# Patient Record
Sex: FEMALE | Race: WHITE | Employment: OTHER | URBAN - METROPOLITAN AREA
[De-identification: names, ages, dates, MRNs, and addresses within clinical notes are randomized per-mention and may not be internally consistent; named-entity substitution may affect disease eponyms.]

---

## 2017-03-13 RX ORDER — ESTRADIOL 10 UG/1
TABLET VAGINAL
Qty: 36 TABLET | Refills: 2 | Status: SHIPPED | OUTPATIENT
Start: 2017-03-13 | End: 2017-06-11

## 2017-03-13 NOTE — TELEPHONE ENCOUNTER
Talked with patient, will refill this time and then the patient follow up with Dr. Candelario Flowers in the future, encouraged patient to call with any c/o, denies any at present,

## 2017-11-15 PROBLEM — K52.9 IBD (INFLAMMATORY BOWEL DISEASE): Status: ACTIVE | Noted: 2017-11-15

## 2017-12-21 PROCEDURE — 82043 UR ALBUMIN QUANTITATIVE: CPT | Performed by: INTERNAL MEDICINE

## 2017-12-21 PROCEDURE — 82570 ASSAY OF URINE CREATININE: CPT | Performed by: INTERNAL MEDICINE

## 2018-06-04 PROBLEM — E66.01 OBESITY, MORBID, BMI 40.0-49.9 (HCC): Status: ACTIVE | Noted: 2018-06-04

## 2019-01-03 PROCEDURE — 87045 FECES CULTURE AEROBIC BACT: CPT | Performed by: INTERNAL MEDICINE

## 2019-01-03 PROCEDURE — 87209 SMEAR COMPLEX STAIN: CPT | Performed by: INTERNAL MEDICINE

## 2019-01-03 PROCEDURE — 87272 CRYPTOSPORIDIUM AG IF: CPT | Performed by: INTERNAL MEDICINE

## 2019-01-03 PROCEDURE — 88305 TISSUE EXAM BY PATHOLOGIST: CPT | Performed by: INTERNAL MEDICINE

## 2019-01-03 PROCEDURE — 87329 GIARDIA AG IA: CPT | Performed by: INTERNAL MEDICINE

## 2019-01-03 PROCEDURE — 87427 SHIGA-LIKE TOXIN AG IA: CPT | Performed by: INTERNAL MEDICINE

## 2019-01-03 PROCEDURE — 88365 INSITU HYBRIDIZATION (FISH): CPT | Performed by: INTERNAL MEDICINE

## 2019-01-03 PROCEDURE — 87493 C DIFF AMPLIFIED PROBE: CPT | Performed by: INTERNAL MEDICINE

## 2019-01-03 PROCEDURE — 87177 OVA AND PARASITES SMEARS: CPT | Performed by: INTERNAL MEDICINE

## 2019-01-03 PROCEDURE — 87046 STOOL CULTR AEROBIC BACT EA: CPT | Performed by: INTERNAL MEDICINE

## 2019-08-16 PROBLEM — M25.511 RIGHT SHOULDER PAIN, UNSPECIFIED CHRONICITY: Status: ACTIVE | Noted: 2019-08-16

## 2019-08-19 PROBLEM — M75.101 ROTATOR CUFF SYNDROME OF RIGHT SHOULDER: Status: ACTIVE | Noted: 2019-08-19

## 2021-03-24 PROCEDURE — 88305 TISSUE EXAM BY PATHOLOGIST: CPT | Performed by: INTERNAL MEDICINE

## 2021-05-07 PROBLEM — K50.10 CROHN'S DISEASE OF COLON WITHOUT COMPLICATION (HCC): Status: ACTIVE | Noted: 2021-05-07

## 2024-11-04 PROBLEM — D72.829 LEUKOCYTOSIS: Status: ACTIVE | Noted: 2024-07-27

## 2024-11-04 PROBLEM — I95.9 HYPOTENSIVE EPISODE: Status: ACTIVE | Noted: 2024-07-26

## 2024-11-04 PROBLEM — M25.561 PAIN IN RIGHT KNEE: Status: ACTIVE | Noted: 2024-07-26

## 2024-11-04 PROBLEM — M17.11 PRIMARY OSTEOARTHRITIS OF RIGHT KNEE: Status: ACTIVE | Noted: 2024-07-22

## 2024-11-04 PROBLEM — R55 SYNCOPE: Status: ACTIVE | Noted: 2024-07-26

## 2024-11-04 PROBLEM — K52.9 GASTROENTERITIS: Status: ACTIVE | Noted: 2024-07-26

## 2024-11-04 PROBLEM — H25.13 AGE-RELATED NUCLEAR CATARACT OF BOTH EYES: Status: ACTIVE | Noted: 2023-01-03

## 2024-11-04 RX ORDER — BALSALAZIDE DISODIUM 750 MG/1
3 CAPSULE ORAL 2 TIMES DAILY
COMMUNITY

## 2024-11-04 RX ORDER — AZELASTINE 1 MG/ML
2 SPRAY, METERED NASAL 2 TIMES DAILY
COMMUNITY

## 2024-11-04 RX ORDER — LORATADINE 10 MG/1
10 TABLET ORAL DAILY
COMMUNITY

## 2024-11-13 ENCOUNTER — LABORATORY ENCOUNTER (OUTPATIENT)
Dept: LAB | Age: 74
End: 2024-11-13
Attending: ORTHOPAEDIC SURGERY
Payer: MEDICARE

## 2024-11-13 ENCOUNTER — EKG ENCOUNTER (OUTPATIENT)
Dept: LAB | Age: 74
End: 2024-11-13
Attending: ORTHOPAEDIC SURGERY
Payer: MEDICARE

## 2024-11-13 DIAGNOSIS — Z01.818 PRE-OP TESTING: ICD-10-CM

## 2024-11-13 LAB
ALBUMIN SERPL-MCNC: 4.2 G/DL (ref 3.2–4.8)
ALBUMIN/GLOB SERPL: 1.7 {RATIO} (ref 1–2)
ALP LIVER SERPL-CCNC: 73 U/L
ALT SERPL-CCNC: 13 U/L
ANION GAP SERPL CALC-SCNC: 5 MMOL/L (ref 0–18)
ANTIBODY SCREEN: NEGATIVE
AST SERPL-CCNC: 31 U/L (ref ?–34)
ATRIAL RATE: 78 BPM
BASOPHILS # BLD AUTO: 0.07 X10(3) UL (ref 0–0.2)
BASOPHILS NFR BLD AUTO: 0.9 %
BILIRUB SERPL-MCNC: 0.5 MG/DL (ref 0.2–1.1)
BUN BLD-MCNC: 13 MG/DL (ref 9–23)
CALCIUM BLD-MCNC: 10.6 MG/DL (ref 8.7–10.4)
CHLORIDE SERPL-SCNC: 109 MMOL/L (ref 98–112)
CO2 SERPL-SCNC: 26 MMOL/L (ref 21–32)
CREAT BLD-MCNC: 0.87 MG/DL
EGFRCR SERPLBLD CKD-EPI 2021: 71 ML/MIN/1.73M2 (ref 60–?)
EOSINOPHIL # BLD AUTO: 0.46 X10(3) UL (ref 0–0.7)
EOSINOPHIL NFR BLD AUTO: 5.9 %
ERYTHROCYTE [DISTWIDTH] IN BLOOD BY AUTOMATED COUNT: 15.6 %
GLOBULIN PLAS-MCNC: 2.5 G/DL (ref 2–3.5)
GLUCOSE BLD-MCNC: 90 MG/DL (ref 70–99)
HCT VFR BLD AUTO: 40.9 %
HGB BLD-MCNC: 13.1 G/DL
IMM GRANULOCYTES # BLD AUTO: 0.02 X10(3) UL (ref 0–1)
IMM GRANULOCYTES NFR BLD: 0.3 %
LYMPHOCYTES # BLD AUTO: 1.89 X10(3) UL (ref 1–4)
LYMPHOCYTES NFR BLD AUTO: 24.1 %
MCH RBC QN AUTO: 28.7 PG (ref 26–34)
MCHC RBC AUTO-ENTMCNC: 32 G/DL (ref 31–37)
MCV RBC AUTO: 89.7 FL
MONOCYTES # BLD AUTO: 0.52 X10(3) UL (ref 0.1–1)
MONOCYTES NFR BLD AUTO: 6.6 %
NEUTROPHILS # BLD AUTO: 4.89 X10 (3) UL (ref 1.5–7.7)
NEUTROPHILS # BLD AUTO: 4.89 X10(3) UL (ref 1.5–7.7)
NEUTROPHILS NFR BLD AUTO: 62.2 %
OSMOLALITY SERPL CALC.SUM OF ELEC: 290 MOSM/KG (ref 275–295)
P AXIS: 51 DEGREES
P-R INTERVAL: 158 MS
PLATELET # BLD AUTO: 237 10(3)UL (ref 150–450)
POTASSIUM SERPL-SCNC: 4.1 MMOL/L (ref 3.5–5.1)
PROT SERPL-MCNC: 6.7 G/DL (ref 5.7–8.2)
Q-T INTERVAL: 368 MS
QRS DURATION: 72 MS
QTC CALCULATION (BEZET): 419 MS
R AXIS: 21 DEGREES
RBC # BLD AUTO: 4.56 X10(6)UL
RH BLOOD TYPE: POSITIVE
SODIUM SERPL-SCNC: 140 MMOL/L (ref 136–145)
T AXIS: 76 DEGREES
VENTRICULAR RATE: 78 BPM
WBC # BLD AUTO: 7.9 X10(3) UL (ref 4–11)

## 2024-11-13 PROCEDURE — 93005 ELECTROCARDIOGRAM TRACING: CPT

## 2024-11-13 PROCEDURE — 86901 BLOOD TYPING SEROLOGIC RH(D): CPT

## 2024-11-13 PROCEDURE — 86850 RBC ANTIBODY SCREEN: CPT

## 2024-11-13 PROCEDURE — 80053 COMPREHEN METABOLIC PANEL: CPT

## 2024-11-13 PROCEDURE — 36415 COLL VENOUS BLD VENIPUNCTURE: CPT

## 2024-11-13 PROCEDURE — 87081 CULTURE SCREEN ONLY: CPT

## 2024-11-13 PROCEDURE — 85025 COMPLETE CBC W/AUTO DIFF WBC: CPT

## 2024-11-13 PROCEDURE — 86900 BLOOD TYPING SEROLOGIC ABO: CPT

## 2024-11-13 PROCEDURE — 93010 ELECTROCARDIOGRAM REPORT: CPT | Performed by: INTERNAL MEDICINE

## 2024-11-14 NOTE — H&P (VIEW-ONLY)
Patient presents with:  Pre-Op: Patient is scheduled for right knee replacement on 11/25/24 with Dr. Daniel Pang @ [facility] fax # [phone]    Chirag Shelton is a 74 year old female who presents for a pre-operative physical exam. Patient is to have RIGHT KNEE REPLACEMENT SURGERY, to be done by Dr. Pang at  on 11/25/2024.      HPI:   Pt has no complaints.  Her sleep is not good but doesn't think its related apnea .. Her  states that she moans when asleep    Current Outpatient Medications   Medication Sig Dispense Refill   • AZELASTINE 0.1 % Nasal Solution USE 1 SPRAY IN EACH NOSTRIL TWICE DAILY 30 mL 0   • ATORVASTATIN 10 MG Oral Tab TAKE 1 TABLET(10 MG) BY MOUTH 3 TIMES A WEEK 38 tablet 0   • Naproxen Sodium (ALEVE OR) Take by mouth. Bid     • tiZANidine HCl 2 MG Oral Cap Take 1 capsule (2 mg total) by mouth 3 (three) times daily as needed for Muscle spasms. 30 capsule 0   • gabapentin 100 MG Oral Cap Take 1 capsule (100 mg total) by mouth nightly. 30 capsule 2   • balsalazide (COLAZAL) 750 MG Oral Cap Take 3 capsules (2,250 mg total) by mouth 2 (two) times a day. 540 capsule 4   • Estradiol 10 MCG Vaginal Tab INSERT 1 TABLET VAGINALLY 2 TIMES A WEEK Strength: 10 mcg 24 tablet 4   • loratadine 10 MG Oral Tab Take 1 tablet (10 mg total) by mouth daily. 30 tablet 3   • lisinopril 5 MG Oral Tab TAKE 1 TABLET(5 MG) BY MOUTH DAILY 90 tablet 1   • Calcium-Vitamin D-Vitamin K (CALCIUM FOR WOMEN OR) Take by mouth.     • Inulin 1.5 G Oral Chew Tab Chew 1 tablet by mouth daily. 30 tablet 0   • Psyllium 0.52 G Oral Cap Take 2 capsules by mouth daily.       • Multiple Vitamins Oral Tab Take 1 tablet by mouth daily.     • Fish Oil-Cholecalciferol (FISH OIL + D3) 0689-5004 MG-UNIT Oral Cap Take 1 capsule by mouth daily.       • aspirin 81 MG Oral Tab EC Take 81 mg by mouth daily.        Allergies:   Zithromax [Azithrom*    NAUSEA AND VOMITING   Past Medical History:   Diagnosis Date   • COVID 09/09/2022     Patient reported from home test   • Crohn's    • History of prolapse of bladder    • Hypertension    • IBD (inflammatory bowel disease) 11/15/2017   • MENOPAUSE     6 years ago   • Mixed hyperlipidemia 05/09/2007   • Primary osteoarthritis involving multiple joints 11/21/2012      Past Surgical History:   Procedure Laterality Date   • CHOLECYSTECTOMY  9/20/11    Laparoscopic Cholecystectomy by Dr. Thompson @ [facility]   • COLONOSCOPY  2004, 2012    Repeat 3 years   • COLONOSCOPY  01/03/2019    Dr. Lagunas   • COLONOSCOPY,BIOPSY N/A 8/13/2015    Procedure: COLONOSCOPY, POSSIBLE BIOPSY, POSSIBLE POLYPECTOMY 80856;  Surgeon: Santhosh Lagunas MD;  Location: [facility] [place], [place] SURGICAL [facility]   • D & C  1980    miscarriage   • HYSTEROSCOPY,WITH SAMPLING  01/05/2012   • KNEE REPLACEMENT SURGERY     • OTHER SURGICAL HISTORY  12/13/2011    Cystoscopy - Dr. Awan   • OTHER SURGICAL HISTORY      left knee   • OTHER SURGICAL HISTORY Bilateral 7/11/2016    Procedure: VAGINAL HYSTERECTOMY WITH BILATERAL SALPINGOOPH;  Surgeon: Dejah Sam MD;  Location: [facility]   • OTHER SURGICAL HISTORY N/A 7/11/2016    Procedure: ANTERIOR POSTERIOR REPAIR;  Surgeon: Leana Davis DO;  Location:  MAIN OR   • TONSILLECTOMY      long time ago      Family History   Adopted: Yes   Family history unknown: Yes      Social History: Occ: Not Working. : Yes. Children: Yes. Smoking: No. EtOH: Socially.  Exercise: minimal.  Diet: watches fats closely and watches sugar closely     REVIEW OF SYSTEMS:   GENERAL: feels well otherwise  SKIN: denies any unusual skin lesions  EYES:denies blurred vision or double vision  HEENT: denies nasal congestion, sinus pain or ST  LUNGS: denies shortness of breath with exertion  CARDIOVASCULAR: denies chest pain on exertion  GI: denies abdominal pain,denies heartburn  : denies dysuria, vaginal discharge or itching, denies nocturia or changes in stream  MUSCULOSKELETAL: chronic progressive right knee pain .. Difficulty  walking; denies back pain  NEURO: denies headaches  PSYCHE: denies depression or anxiety  HEMATOLOGIC: denies hx of anemia  ENDOCRINE: denies thyroid history  ALL/ASTHMA: denies hx of allergy or asthma    EXAM:   /78   Pulse 82   Temp 97.6 °F (36.4 °C) (Oral)   Ht 5' 3.78\" (1.62 m)   Wt 238 lb (108 kg)   SpO2 98%   BMI 41.13 kg/m²   GENERAL: well developed, well nourished,in no apparent distress  SKIN: no rashes,no suspicious lesions  HEENT: atraumatic, normocephalic,ears and throat are clear  EYES:PERRLA, EOMI, normal optic disk,conjunctiva are clear  NECK: supple,no adenopathy,no bruits  CHEST: no chest tenderness  BREAST: no dominant or suspicious mass  LUNGS: clear to auscultation  CARDIO: RRR without murmur  GI: good BS's,no masses, HSM or tenderness  : deferred  RECTAL: deferred  MUSCULOSKELETAL: back is not tender,FROM of the back  EXTREMITIES: no cyanosis, clubbing or edema  NEURO: Oriented times three,cranial nerves are intact,motor and sensory are grossly intact    ASSESSMENT AND PLAN:   Chirag Shelton is a 74 year old female who presents for a 1. Pre-op exam  2. Primary osteoarthritis of right knee  - reviewed pt's history and meds  - ELECTROCARDIOGRAM, COMPLETE- NSR/ Left Atrial Enlargement/ Borderline ECG compared to 2022  - Patient is to have RIGHT KNEE REPLACEMENT SURGERY, to be done by Dr. Pang at  on 11/25/2024.  .   - Pt has the following conditions:       3. Mixed hyperlipidemia  - Continue with current meds    4. Essential hypertension  - Continue with Lisinopril    5. Obesity, morbid, BMI 40.0-49.9 (HCC)  - weight management     6. Crohn's disease of colon without complication ([facility])  - Hold Colazal before surgery per gi recommendation    Hold all supplements/ MVI 2 weeks before     Pt has no significant history of cardiac or pulmonary conditions.   Pt is a moderate acceptable risk surgical candidate based on her weight .   This consult was sent back the referring physician,   Poly.   ID#[id number]

## 2024-11-15 ENCOUNTER — ANESTHESIA EVENT (OUTPATIENT)
Dept: SURGERY | Facility: HOSPITAL | Age: 74
End: 2024-11-15
Payer: MEDICARE

## 2024-11-15 NOTE — PAT NURSING NOTE
Chart reviewed by anesthesiologist DR Jauregui for abnormal EKG. Received an order for  cardiac clearance. Faxed this request to the surgeon and Received fax confirmation. Telephoned PCP office and spoke to Queenie and matheus of above and requested that the clearance be faxed to the PAT department ASAP.

## 2024-11-18 NOTE — PAT NURSING NOTE
Received EKG done 11/14 (day after [facility] EKG) per pcp. Pcp cleared pt for surgery in H and P. Spoke w /Anesthesia to review new EKG and determine if any further action needed. Spoke to pt earlier today and she does not have cardio appt yet. Will notify pt if cardio clearance no longer required.

## 2024-11-22 NOTE — DISCHARGE INSTRUCTIONS
Medications:  Aspirin 81 mg:  take one tab twice daily to prevent blood clot.    Colace is a stool softener.  Please take twice daily.  Extra Strength Tylenol.  Take 2 tabs (1000mg) 4 times daily.  (maximum 4000mg daily)  Celebrex is a nonsteroidal anti-inflammatory medication. Please take one tab daily.  Tramadol is a pain medication for moderate pain.  Use as needed per instruction.  Oxycodone is a pain medication for severe pain.  Use as needed per instruction  Do not take Tramadol and Oxydone at the same time.  Space out at least one hour.    Sometimes managing your health at home requires assistance.  The [facility] team has recognized your preference to use [facility].  They can be reached by phone at [phone].  The fax number for your reference is [phone].  A representative from the home health agency will contact you or your family to schedule your first visit.     Knee Replacement Discharge Instructions    Dear Patient,     [facility] cares about your progress with recovery following your joint replacement surgery.     300 days from your scheduled surgery, [facility] will send you a follow-up survey to help us understand how your surgery impacted your mobility, pain, and overall quality of life. Please make every effort to complete this survey. The information collected from this survey will be used by your physician to track your recovery.     Sincerely,     [facility] [place]      Activity    Bathing  No tub baths, pools, or saunas until cleared by surgeon (about 4-6 weeks because it takes that long for the incision on the skin to heal and be a barrier to prevent infection).  When allowed to shower:    IF AQUACEL - dressing is waterproof and does not require being covered to keep it dry.  Pat dressing and surrounding skin dry after shower              AQUACEL          Gauze dressings are NOT waterproof and REQUIRE being covered  with a waterproof barrier to keep the dressing and the incision dry.  SARAN WRAP, GLAD WRAP, and PRESS N SEAL WORK  REALLY WELL BUT ANY PLASTIC WRAP WILL DO.   Do not wash incision.   Remove entire wrapping and old dressing (if Gauze) after showering. Pat dry if necessary WITH A CLEAN TOWEL and cover incision with dry sterile gauze and paper tape. For other types of dressings, follow surgeon’s orders.                                 GAUZE          Driving  Do not drive until cleared by surgeon. This is usually in four to six weeks after surgery. Discuss at follow-up office visit.   Not allowed while taking narcotic pain medication or muscle relaxants.    Sex  Usually allowed after four to six weeks - check with surgeon at your office visit.    Return to work  Usually allowed after four to six weeks. Discuss specific work activities with your surgeon.    Restrictions  For knee replacement surgery, follow instructions provided by physical therapy.  Do NOT put a pillow under your knee as it may be more difficult to straighten afterwards.    No smoking  Avoid smoking. It is known to cause breathing problems and can decrease the rate of healing.    Incision care/Dressing changes  Wash hands before and after dressing changes.  FOR DRY GAUZE DRESSING:  Change dressing daily using dry sterile gauze and paper tape once Aquacel (waterproof) dressing changed (which is about 7 days after surgery). Continue this until you follow up in the office with your surgeon. Have knee bent when changing dressing for more ease of bending afterwards.  There could be a small amount of redness around the staples or incision; this is normal.  Watch for increased redness, warmth, any odor, increased drainage or opening of the incision. A little clear yellow or blood tinged drainage is normal up to 2 weeks after surgery but it should be less every day until it stops.  Call physician if you notice any concerning changes.  Sutures/staples will be  removed at first office visit (10 days- 3 weeks).                          GAUZE                                                   Medication  Anticoagulants = blood thinners (Xarelto, Eliquis, Lovenox, Coumadin, or Aspirin)  Pill or shot form depending on what your physician orders.   IF placed on Coumadin, you may also need lab work done for monitoring.  You will bleed easier and bruise easier while on these medications.   Usually you will be on a blood thinner for about 2-5 weeks depending what physician orders.  Contact your physician if you have signs of bruising, nose bleeds or blood in your urine. You may consider using electric razors and soft bristle toothbrushes.  Do not take aspirin while taking blood thinners unless ordered by your physician.  Review anticoagulant education information sheet provided.    Discomfort  Surgical discomfort is normal for one to two months.  Have realistic goals and keep a positive outlook.  You may need pain medication regularly (every 4-6 hours) the first 2 weeks and then begin to decrease how often you are taking it.  Take pain medication as prescribed with food, especially before therapy, allowing 30-60 minutes to take effect.  Do not drink alcohol while on pain medication.  Keep pain manageable; pain should not disrupt your sleep or activities like getting out of bed or walking.  As you have less discomfort, decrease the amount of pain medication you take. Use plain Tylenol (acetaminophen) for less severe pain.  Some pain medications have Tylenol (acetaminophen) in them such as Norco and Percocet. Do NOT take Tylenol (acetaminophen) within 4 hours of a dose of these medications.  Apply ice or cold therapy to surgical site for 20 minutes at least four times a day, especially after therapy. Be sure there is a thin cloth barrier between skin and ice or cold therapy.  Change position at least every 45 minutes while awake to avoid stiffness or increased discomfort.  For knee  replacements- may elevate your leg by placing a pillow under entire leg. Do not place pillow only under the knee.  Have realistic goals and keep a positive outlook.  Deep breathing and relaxation techniques and distractions can help!  If you focus on something else, you do not experience the pain the same. Take advantage of everything available to your to help control you discomfort.  Contact physician if discomfort does not respond to pain medication.    Body changes  Constipation is common with the use of narcotics.  Eat fiber rich foods and drink plenty of fluids.  Use stool softeners such as Colace or Senakot while on narcotics, and laxatives such as Miralax or Milk of Magnesia if needed.   An enema or suppository may be needed if above measures do not work.    Prevention of infection and promotion of healing  Good hand washing is important. Everyone should wash their hands or use hand  as soon as they walk in your house-whether they live there or are visiting.  Keep bed linen/clothing freshly laundered.  Do not allow others or pets to touch your incision.  Avoid people that have colds or the flu.  Your surgeon may recommend that you take antibiotics before you undergo any dental or other invasive surgical procedures after your joint replacement. Speak with your physician about this at your post-op office visit.  Eat a balanced diet high in fiber and drink plenty of fluids.   Continue using incentive spirometry because narcotics make you sleepy so you may not take good deep breaths. We do not want you to get pneumonia.     Post op Office visits  Schedule 10 days to 3 weeks after surgery WITH SURGEON’s office.  Additional visits may need to be scheduled. Your physician will discuss this at first post-op office visit.  Schedule outpatient physical therapy per your surgeon’s orders.  Schedule one week follow up after surgery WITH PRIMARY CARE PHYSICIAN; review your medications over last 6 months. Your body  gets stressed by surgery and that stress can affect all your other health issues (such as high blood pressure, diabetes, CHF, afib, and asthma just to name a few).  We don’t want those other health issues to cause you to get readmitted to the hospital; much better for you to catch developing problems and prevent them from becoming larger ones.      Notify your surgeon if you notice any  of the following signs  Separation of incision line.  Increased redness, swelling, or warmth of skin around incision.  Increased or foul smelling drainage from incision  Red streaks on skin near incision.  Temperature >101 F.  Increased pain at incision not relieved by pain medication.      Signs of blood clot  Pain, excessive tenderness, redness, or swelling in leg or calf (other than incision site).  (CALL SURGEON)      Go directly to the ER or CALL 911 if  you:  become short of breath  have chest pain  cough up blood  have unexplained anxiety with breathing  Traveling and Handicapped parking  Check with you surgeon when allowed so you don’t set yourself up for greater chance of complications.  If traveling by car, get out to stretch every 2 hours.  This helps prevent stiffness.  You may need to do this any time you travel for the first year after surgery.  If traveling by plane, BEFORE you get into a security line, let them know that you had your hip replaced, as you will most likely set off the metal detector. The doctors no longer provide an identification card for this as they are easily copied. ALSO request a wheelchair the first year to board and get off a plane…this aids in priority seating and you should sit on the aisle or at the bulkhead where you can easily stretch your legs and get up to walk up and down the aisles…this helps prevent blood clots and stiffness.  TEMPORARY HANDICAP PARKING APPLICATION  (good for 3-6 months)  - At Surgeon or PCP visit, request they fill out the form, then go to [facility] (only time you do not  wait in a long line there). Some [facility] offices provide the same service. ([place], [place] and [place] have this service; if you live in another [facility], you may check with them as well). You need space to open car doors to position yourself properly with walker to get in and out of your car safely; some parking spaces are  practically on top of each other and do not give you enough room.    SPECIAL  INSTRUCTIONS:  \Spanda- knee replacement (single layer compression sleeve):  All patients should wear the compression sleeves (SPANDA-) until first follow up visit to surgeon’s office ( about 2-3 weeks) and then check with surgeon if need to continue use.  Take off to shower. Best to keep on as much as possible, even at night.  Wash with mild soap and water; DRIP dry overnight.    Directions to put on and off:  IT TAKES 2 PIECES OF SPANDA- (compression sleeves), (USUALLY DIFFERENT SIZES because a calf is usually smaller than a knee.)   Use the longer tube (spanda-/compression sleeve) pulled up over the calf.   This 1st piece (spanda-/compression sleeve) should be on the calf part of the leg, from just below the knee to the ball of the foot to expose the toes.   The 2nd piece (shorter compression sleeve) is pulled over and past the first sleeve onto the knee. The lower edge of the knee portion should overlap the top of the calf spanda- by a few inches. This is the only place where the 2 different pieces overlap.  The top edge of the second spanda- should be about a few inches above the knee but it should NOT be rolling down. The spanda- should be flat so that it does not roll and become too tight.  Makes sure it is NOT bunched up anywhere.   IF the spanda- feels TOO tight, then REMOVE it and call your surgeon to let them know.

## 2024-11-25 ENCOUNTER — HOSPITAL ENCOUNTER (OUTPATIENT)
Facility: HOSPITAL | Age: 74
Discharge: HOME | End: 2024-11-26
Attending: ORTHOPAEDIC SURGERY | Admitting: ORTHOPAEDIC SURGERY
Payer: MEDICARE

## 2024-11-25 ENCOUNTER — APPOINTMENT (OUTPATIENT)
Dept: GENERAL RADIOLOGY | Facility: HOSPITAL | Age: 74
End: 2024-11-25
Attending: PHYSICIAN ASSISTANT
Payer: MEDICARE

## 2024-11-25 ENCOUNTER — ANESTHESIA (OUTPATIENT)
Dept: SURGERY | Facility: HOSPITAL | Age: 74
End: 2024-11-25
Payer: MEDICARE

## 2024-11-25 DIAGNOSIS — Z01.818 PRE-OP TESTING: Primary | ICD-10-CM

## 2024-11-25 PROBLEM — I95.9 HYPOTENSIVE EPISODE: Status: RESOLVED | Noted: 2024-07-26 | Resolved: 2024-11-25

## 2024-11-25 PROCEDURE — 88311 DECALCIFY TISSUE: CPT | Performed by: ORTHOPAEDIC SURGERY

## 2024-11-25 PROCEDURE — 76942 ECHO GUIDE FOR BIOPSY: CPT | Performed by: ANESTHESIOLOGY

## 2024-11-25 PROCEDURE — 73560 X-RAY EXAM OF KNEE 1 OR 2: CPT | Performed by: PHYSICIAN ASSISTANT

## 2024-11-25 PROCEDURE — 0SRC069 REPLACEMENT OF RIGHT KNEE JOINT WITH OXIDIZED ZIRCONIUM ON POLYETHYLENE SYNTHETIC SUBSTITUTE, CEMENTED, OPEN APPROACH: ICD-10-PCS | Performed by: ORTHOPAEDIC SURGERY

## 2024-11-25 PROCEDURE — 88305 TISSUE EXAM BY PATHOLOGIST: CPT | Performed by: ORTHOPAEDIC SURGERY

## 2024-11-25 PROCEDURE — S0028 INJECTION, FAMOTIDINE, 20 MG: HCPCS | Performed by: PHYSICIAN ASSISTANT

## 2024-11-25 DEVICE — ATTUNE KNEE SYSTEM FEMORAL CRUCIATE RETAINING SIZE 4 RIGHT CEMENTED
Type: IMPLANTABLE DEVICE | Site: KNEE | Status: FUNCTIONAL
Brand: ATTUNE

## 2024-11-25 DEVICE — ATTUNE PATELLA MEDIALIZED DOME 35MM CEMENTED AOX
Type: IMPLANTABLE DEVICE | Site: KNEE | Status: FUNCTIONAL
Brand: ATTUNE

## 2024-11-25 DEVICE — ATTUNE KNEE SYSTEM TIBIAL BASE FIXED BEARING SIZE 3 CEMENTED
Type: IMPLANTABLE DEVICE | Site: KNEE | Status: FUNCTIONAL
Brand: ATTUNE

## 2024-11-25 DEVICE — SMARTSET HIGH PERFORMANCE MV MEDIUM VISCOSITY BONE CEMENT 40G
Type: IMPLANTABLE DEVICE | Site: KNEE | Status: FUNCTIONAL
Brand: SMARTSET

## 2024-11-25 DEVICE — ATTUNE KNEE SYSTEM TIBIAL INSERT FIXED BEARING MEDIAL STABILIZED RIGHT AOX 4, 6MM
Type: IMPLANTABLE DEVICE | Site: KNEE | Status: FUNCTIONAL
Brand: ATTUNE

## 2024-11-25 RX ORDER — ONDANSETRON 2 MG/ML
4 INJECTION INTRAMUSCULAR; INTRAVENOUS EVERY 6 HOURS PRN
Status: DISCONTINUED | OUTPATIENT
Start: 2024-11-25 | End: 2024-11-25 | Stop reason: HOSPADM

## 2024-11-25 RX ORDER — HYDROMORPHONE HYDROCHLORIDE 1 MG/ML
0.4 INJECTION, SOLUTION INTRAMUSCULAR; INTRAVENOUS; SUBCUTANEOUS EVERY 2 HOUR PRN
Status: DISCONTINUED | OUTPATIENT
Start: 2024-11-25 | End: 2024-11-26

## 2024-11-25 RX ORDER — ASPIRIN 81 MG/1
81 TABLET ORAL 2 TIMES DAILY
COMMUNITY

## 2024-11-25 RX ORDER — ACETAMINOPHEN 500 MG
1000 TABLET ORAL ONCE AS NEEDED
Status: DISCONTINUED | OUTPATIENT
Start: 2024-11-25 | End: 2024-11-25 | Stop reason: HOSPADM

## 2024-11-25 RX ORDER — POLYETHYLENE GLYCOL 3350 17 G/17G
17 POWDER, FOR SOLUTION ORAL DAILY PRN
Status: DISCONTINUED | OUTPATIENT
Start: 2024-11-25 | End: 2024-11-26

## 2024-11-25 RX ORDER — IBUPROFEN 600 MG/1
600 TABLET, FILM COATED ORAL EVERY 6 HOURS SCHEDULED
Status: DISCONTINUED | OUTPATIENT
Start: 2024-11-26 | End: 2024-11-26

## 2024-11-25 RX ORDER — DEXAMETHASONE SODIUM PHOSPHATE 10 MG/ML
INJECTION, SOLUTION INTRAMUSCULAR; INTRAVENOUS AS NEEDED
Status: DISCONTINUED | OUTPATIENT
Start: 2024-11-25 | End: 2024-11-25 | Stop reason: SURG

## 2024-11-25 RX ORDER — CELECOXIB 200 MG/1
200 CAPSULE ORAL DAILY
COMMUNITY
Start: 2024-11-19

## 2024-11-25 RX ORDER — DEXAMETHASONE SODIUM PHOSPHATE 10 MG/ML
8 INJECTION, SOLUTION INTRAMUSCULAR; INTRAVENOUS ONCE
Status: COMPLETED | OUTPATIENT
Start: 2024-11-26 | End: 2024-11-26

## 2024-11-25 RX ORDER — LABETALOL HYDROCHLORIDE 5 MG/ML
INJECTION, SOLUTION INTRAVENOUS
Status: DISCONTINUED
Start: 2024-11-25 | End: 2024-11-25 | Stop reason: WASHOUT

## 2024-11-25 RX ORDER — ACETAMINOPHEN 325 MG/1
650 TABLET ORAL
Status: DISCONTINUED | OUTPATIENT
Start: 2024-11-25 | End: 2024-11-26

## 2024-11-25 RX ORDER — TRAMADOL HYDROCHLORIDE 50 MG/1
1 TABLET ORAL EVERY 6 HOURS PRN
COMMUNITY
Start: 2024-11-19

## 2024-11-25 RX ORDER — BISACODYL 10 MG
10 SUPPOSITORY, RECTAL RECTAL
Status: DISCONTINUED | OUTPATIENT
Start: 2024-11-25 | End: 2024-11-26

## 2024-11-25 RX ORDER — KETOROLAC TROMETHAMINE 30 MG/ML
15 INJECTION, SOLUTION INTRAMUSCULAR; INTRAVENOUS EVERY 6 HOURS
Status: COMPLETED | OUTPATIENT
Start: 2024-11-25 | End: 2024-11-26

## 2024-11-25 RX ORDER — HYDRALAZINE HYDROCHLORIDE 20 MG/ML
5 INJECTION INTRAMUSCULAR; INTRAVENOUS EVERY 10 MIN PRN
Status: COMPLETED | OUTPATIENT
Start: 2024-11-25 | End: 2024-11-25

## 2024-11-25 RX ORDER — DIPHENHYDRAMINE HYDROCHLORIDE 50 MG/ML
12.5 INJECTION INTRAMUSCULAR; INTRAVENOUS AS NEEDED
Status: DISCONTINUED | OUTPATIENT
Start: 2024-11-25 | End: 2024-11-25 | Stop reason: HOSPADM

## 2024-11-25 RX ORDER — TRANEXAMIC ACID 10 MG/ML
1000 INJECTION, SOLUTION INTRAVENOUS ONCE
Status: COMPLETED | OUTPATIENT
Start: 2024-11-25 | End: 2024-11-25

## 2024-11-25 RX ORDER — ACETAMINOPHEN 325 MG/1
TABLET ORAL
Status: COMPLETED
Start: 2024-11-25 | End: 2024-11-25

## 2024-11-25 RX ORDER — SODIUM CHLORIDE, SODIUM LACTATE, POTASSIUM CHLORIDE, CALCIUM CHLORIDE 600; 310; 30; 20 MG/100ML; MG/100ML; MG/100ML; MG/100ML
INJECTION, SOLUTION INTRAVENOUS CONTINUOUS
Status: DISCONTINUED | OUTPATIENT
Start: 2024-11-25 | End: 2024-11-26

## 2024-11-25 RX ORDER — HYDROMORPHONE HYDROCHLORIDE 1 MG/ML
0.2 INJECTION, SOLUTION INTRAMUSCULAR; INTRAVENOUS; SUBCUTANEOUS EVERY 2 HOUR PRN
Status: DISCONTINUED | OUTPATIENT
Start: 2024-11-25 | End: 2024-11-26

## 2024-11-25 RX ORDER — DIPHENHYDRAMINE HYDROCHLORIDE 50 MG/ML
12.5 INJECTION INTRAMUSCULAR; INTRAVENOUS EVERY 4 HOURS PRN
Status: DISCONTINUED | OUTPATIENT
Start: 2024-11-25 | End: 2024-11-26

## 2024-11-25 RX ORDER — ACETAMINOPHEN 325 MG/1
650 TABLET ORAL ONCE
Status: COMPLETED | OUTPATIENT
Start: 2024-11-25 | End: 2024-11-25

## 2024-11-25 RX ORDER — ACETAMINOPHEN 500 MG
1000 TABLET ORAL 4 TIMES DAILY
COMMUNITY
Start: 2024-11-19

## 2024-11-25 RX ORDER — ASPIRIN 81 MG/1
81 TABLET ORAL 2 TIMES DAILY
Status: DISCONTINUED | OUTPATIENT
Start: 2024-11-26 | End: 2024-11-26

## 2024-11-25 RX ORDER — MEPERIDINE HYDROCHLORIDE 25 MG/ML
12.5 INJECTION INTRAMUSCULAR; INTRAVENOUS; SUBCUTANEOUS AS NEEDED
Status: DISCONTINUED | OUTPATIENT
Start: 2024-11-25 | End: 2024-11-25 | Stop reason: HOSPADM

## 2024-11-25 RX ORDER — HYDRALAZINE HYDROCHLORIDE 20 MG/ML
INJECTION INTRAMUSCULAR; INTRAVENOUS
Status: COMPLETED
Start: 2024-11-25 | End: 2024-11-25

## 2024-11-25 RX ORDER — BUPIVACAINE HYDROCHLORIDE 7.5 MG/ML
INJECTION, SOLUTION INTRASPINAL AS NEEDED
Status: DISCONTINUED | OUTPATIENT
Start: 2024-11-25 | End: 2024-11-25 | Stop reason: SURG

## 2024-11-25 RX ORDER — DOCUSATE SODIUM 100 MG/1
100 CAPSULE, LIQUID FILLED ORAL 2 TIMES DAILY
Status: DISCONTINUED | OUTPATIENT
Start: 2024-11-25 | End: 2024-11-26

## 2024-11-25 RX ORDER — MIDAZOLAM HYDROCHLORIDE 1 MG/ML
INJECTION INTRAMUSCULAR; INTRAVENOUS AS NEEDED
Status: DISCONTINUED | OUTPATIENT
Start: 2024-11-25 | End: 2024-11-25 | Stop reason: SURG

## 2024-11-25 RX ORDER — SENNOSIDES 8.6 MG
17.2 TABLET ORAL NIGHTLY
Status: DISCONTINUED | OUTPATIENT
Start: 2024-11-25 | End: 2024-11-26

## 2024-11-25 RX ORDER — DEXAMETHASONE SODIUM PHOSPHATE 4 MG/ML
VIAL (ML) INJECTION AS NEEDED
Status: DISCONTINUED | OUTPATIENT
Start: 2024-11-25 | End: 2024-11-25 | Stop reason: SURG

## 2024-11-25 RX ORDER — FAMOTIDINE 20 MG/1
20 TABLET, FILM COATED ORAL 2 TIMES DAILY
Status: DISCONTINUED | OUTPATIENT
Start: 2024-11-25 | End: 2024-11-26

## 2024-11-25 RX ORDER — PSEUDOEPHEDRINE HCL 30 MG
100 TABLET ORAL 2 TIMES DAILY
COMMUNITY

## 2024-11-25 RX ORDER — LABETALOL HYDROCHLORIDE 5 MG/ML
5 INJECTION, SOLUTION INTRAVENOUS EVERY 5 MIN PRN
Status: DISCONTINUED | OUTPATIENT
Start: 2024-11-25 | End: 2024-11-25 | Stop reason: HOSPADM

## 2024-11-25 RX ORDER — HYDROCODONE BITARTRATE AND ACETAMINOPHEN 5; 325 MG/1; MG/1
1 TABLET ORAL ONCE AS NEEDED
Status: DISCONTINUED | OUTPATIENT
Start: 2024-11-25 | End: 2024-11-25

## 2024-11-25 RX ORDER — SODIUM PHOSPHATE, DIBASIC AND SODIUM PHOSPHATE, MONOBASIC 7; 19 G/230ML; G/230ML
1 ENEMA RECTAL ONCE AS NEEDED
Status: DISCONTINUED | OUTPATIENT
Start: 2024-11-25 | End: 2024-11-26

## 2024-11-25 RX ORDER — HYDROMORPHONE HYDROCHLORIDE 1 MG/ML
0.2 INJECTION, SOLUTION INTRAMUSCULAR; INTRAVENOUS; SUBCUTANEOUS EVERY 5 MIN PRN
Status: DISCONTINUED | OUTPATIENT
Start: 2024-11-25 | End: 2024-11-25 | Stop reason: HOSPADM

## 2024-11-25 RX ORDER — OXYCODONE HYDROCHLORIDE 5 MG/1
1 TABLET ORAL EVERY 6 HOURS PRN
COMMUNITY
Start: 2024-11-19

## 2024-11-25 RX ORDER — FAMOTIDINE 10 MG/ML
20 INJECTION, SOLUTION INTRAVENOUS 2 TIMES DAILY
Status: DISCONTINUED | OUTPATIENT
Start: 2024-11-25 | End: 2024-11-26

## 2024-11-25 RX ORDER — OXYCODONE HYDROCHLORIDE 5 MG/1
5 TABLET ORAL EVERY 4 HOURS PRN
Status: DISCONTINUED | OUTPATIENT
Start: 2024-11-25 | End: 2024-11-26

## 2024-11-25 RX ORDER — HYDROMORPHONE HYDROCHLORIDE 1 MG/ML
0.4 INJECTION, SOLUTION INTRAMUSCULAR; INTRAVENOUS; SUBCUTANEOUS EVERY 5 MIN PRN
Status: DISCONTINUED | OUTPATIENT
Start: 2024-11-25 | End: 2024-11-25 | Stop reason: HOSPADM

## 2024-11-25 RX ORDER — METOCLOPRAMIDE HYDROCHLORIDE 5 MG/ML
10 INJECTION INTRAMUSCULAR; INTRAVENOUS EVERY 8 HOURS PRN
Status: DISCONTINUED | OUTPATIENT
Start: 2024-11-25 | End: 2024-11-26

## 2024-11-25 RX ORDER — HYDROCODONE BITARTRATE AND ACETAMINOPHEN 5; 325 MG/1; MG/1
2 TABLET ORAL ONCE AS NEEDED
Status: DISCONTINUED | OUTPATIENT
Start: 2024-11-25 | End: 2024-11-25

## 2024-11-25 RX ORDER — TRAMADOL HYDROCHLORIDE 50 MG/1
50 TABLET ORAL EVERY 6 HOURS SCHEDULED
Status: DISCONTINUED | OUTPATIENT
Start: 2024-11-25 | End: 2024-11-26

## 2024-11-25 RX ORDER — HYDROMORPHONE HYDROCHLORIDE 1 MG/ML
0.6 INJECTION, SOLUTION INTRAMUSCULAR; INTRAVENOUS; SUBCUTANEOUS EVERY 5 MIN PRN
Status: DISCONTINUED | OUTPATIENT
Start: 2024-11-25 | End: 2024-11-25 | Stop reason: HOSPADM

## 2024-11-25 RX ORDER — ONDANSETRON 2 MG/ML
INJECTION INTRAMUSCULAR; INTRAVENOUS AS NEEDED
Status: DISCONTINUED | OUTPATIENT
Start: 2024-11-25 | End: 2024-11-25 | Stop reason: SURG

## 2024-11-25 RX ORDER — DIPHENHYDRAMINE HYDROCHLORIDE 50 MG/ML
25 INJECTION INTRAMUSCULAR; INTRAVENOUS ONCE AS NEEDED
Status: ACTIVE | OUTPATIENT
Start: 2024-11-25 | End: 2024-11-25

## 2024-11-25 RX ORDER — NALOXONE HYDROCHLORIDE 0.4 MG/ML
80 INJECTION, SOLUTION INTRAMUSCULAR; INTRAVENOUS; SUBCUTANEOUS AS NEEDED
Status: DISCONTINUED | OUTPATIENT
Start: 2024-11-25 | End: 2024-11-25 | Stop reason: HOSPADM

## 2024-11-25 RX ORDER — ONDANSETRON 2 MG/ML
4 INJECTION INTRAMUSCULAR; INTRAVENOUS EVERY 6 HOURS PRN
Status: DISCONTINUED | OUTPATIENT
Start: 2024-11-25 | End: 2024-11-26

## 2024-11-25 RX ORDER — ACETAMINOPHEN 500 MG
1000 TABLET ORAL ONCE
Status: DISCONTINUED | OUTPATIENT
Start: 2024-11-25 | End: 2024-11-25 | Stop reason: HOSPADM

## 2024-11-25 RX ORDER — METOCLOPRAMIDE HYDROCHLORIDE 5 MG/ML
10 INJECTION INTRAMUSCULAR; INTRAVENOUS EVERY 8 HOURS PRN
Status: DISCONTINUED | OUTPATIENT
Start: 2024-11-25 | End: 2024-11-25 | Stop reason: HOSPADM

## 2024-11-25 RX ORDER — HYDROMORPHONE HYDROCHLORIDE 1 MG/ML
INJECTION, SOLUTION INTRAMUSCULAR; INTRAVENOUS; SUBCUTANEOUS
Status: COMPLETED
Start: 2024-11-25 | End: 2024-11-25

## 2024-11-25 RX ORDER — SODIUM CHLORIDE, SODIUM LACTATE, POTASSIUM CHLORIDE, CALCIUM CHLORIDE 600; 310; 30; 20 MG/100ML; MG/100ML; MG/100ML; MG/100ML
INJECTION, SOLUTION INTRAVENOUS CONTINUOUS
Status: DISCONTINUED | OUTPATIENT
Start: 2024-11-25 | End: 2024-11-25 | Stop reason: HOSPADM

## 2024-11-25 RX ORDER — DIPHENHYDRAMINE HCL 25 MG
25 CAPSULE ORAL EVERY 4 HOURS PRN
Status: DISCONTINUED | OUTPATIENT
Start: 2024-11-25 | End: 2024-11-26

## 2024-11-25 RX ADMIN — DEXAMETHASONE SODIUM PHOSPHATE 4 MG: 4 MG/ML VIAL (ML) INJECTION at 15:18:00

## 2024-11-25 RX ADMIN — SODIUM CHLORIDE, SODIUM LACTATE, POTASSIUM CHLORIDE, CALCIUM CHLORIDE: 600; 310; 30; 20 INJECTION, SOLUTION INTRAVENOUS at 13:48:00

## 2024-11-25 RX ADMIN — DEXAMETHASONE SODIUM PHOSPHATE 2 MG: 10 INJECTION, SOLUTION INTRAMUSCULAR; INTRAVENOUS at 14:03:00

## 2024-11-25 RX ADMIN — SODIUM CHLORIDE, SODIUM LACTATE, POTASSIUM CHLORIDE, CALCIUM CHLORIDE: 600; 310; 30; 20 INJECTION, SOLUTION INTRAVENOUS at 15:02:00

## 2024-11-25 RX ADMIN — MIDAZOLAM HYDROCHLORIDE 3 MG: 1 INJECTION INTRAMUSCULAR; INTRAVENOUS at 13:50:00

## 2024-11-25 RX ADMIN — SODIUM CHLORIDE, SODIUM LACTATE, POTASSIUM CHLORIDE, CALCIUM CHLORIDE: 600; 310; 30; 20 INJECTION, SOLUTION INTRAVENOUS at 14:06:00

## 2024-11-25 RX ADMIN — ONDANSETRON 4 MG: 2 INJECTION INTRAMUSCULAR; INTRAVENOUS at 13:51:00

## 2024-11-25 RX ADMIN — BUPIVACAINE HYDROCHLORIDE 1.7 ML: 7.5 INJECTION, SOLUTION INTRASPINAL at 13:57:00

## 2024-11-25 RX ADMIN — TRANEXAMIC ACID 1000 MG: 10 INJECTION, SOLUTION INTRAVENOUS at 14:04:00

## 2024-11-25 NOTE — INTERVAL H&P NOTE
Pre-op Diagnosis: RIGHT KNEE OSTEOARTHRITIS    The above referenced H&P was reviewed by Daniel Pang MD on 11/25/2024, the patient was examined and no significant changes have occurred in the patient's condition since the H&P was performed.  I discussed with the patient and/or legal representative the potential benefits, risks and side effects of this procedure; the likelihood of the patient achieving goals; and potential problems that might occur during recuperation.  I discussed reasonable alternatives to the procedure, including risks, benefits and side effects related to the alternatives and risks related to not receiving this procedure.  We will proceed with procedure as planned.

## 2024-11-25 NOTE — ANESTHESIA PROCEDURE NOTES
Spinal Block    Date/Time: 11/25/2024 1:53 PM    Performed by: Daryl Montes MD  Authorized by: Daryl Montes MD      General Information and Staff    Start Time:  11/25/2024 1:53 PM  End Time:  11/25/2024 1:57 PM  Anesthesiologist:  Daryl Montes MD  Performed by:  Anesthesiologist  Site identification: surface landmarks    Preanesthetic Checklist: patient identified, IV checked, risks and benefits discussed, monitors and equipment checked, pre-op evaluation, timeout performed, anesthesia consent and sterile technique used      Procedure Details    Patient Position:  Sitting  Prep: ChloraPrep    Monitoring:  Cardiac monitor, heart rate and continuous pulse ox  Approach:  Midline  Location:  L3-4  Injection Technique:  Single-shot    Needle    Needle Type:  Sprotte  Needle Gauge:  24 G  Needle Length:  3.5 in    Assessment    Sensory Level:  T10  Events: clear CSF, CSF aspirated, well tolerated and blood negative      Additional Comments     Clear CSF aspirated and local easily injected     Poor bolus formation Female

## 2024-11-25 NOTE — ANESTHESIA PREPROCEDURE EVALUATION
PRE-OP EVALUATION    Patient Name: Chirag Shelton    Admit Diagnosis: RIGHT KNEE OSTEOARTHRITIS    Pre-op Diagnosis: RIGHT KNEE OSTEOARTHRITIS    RIGHT TOTAL KNEE REPLACEMENT    Anesthesia Procedure: RIGHT TOTAL KNEE REPLACEMENT (Right: Knee)    Surgeons and Role:     * Daniel Pang MD - Primary    Pre-op vitals reviewed.  Temp: 97.3 °F (36.3 °C)  Pulse: 74  Resp: 16  BP: 157/60  SpO2: 99 %  Body mass index is 42.96 kg/m².    Current medications reviewed.  Hospital Medications:   [Transfer Hold] acetaminophen (Tylenol Extra Strength) tab 1,000 mg  1,000 mg Oral Once    lactated ringers infusion   Intravenous Continuous    [COMPLETED] tranexamic acid in sodium chloride 0.7% (Cyklokapron) 1000 mg/100mL infusion premix 1,000 mg  1,000 mg Intravenous Once    [COMPLETED] ceFAZolin (Ancef) 2g in 10mL IV syringe premix  2 g Intravenous Once    [COMPLETED] clonidine/epinephrine/ropivacaine/ketorolac in 0.9% NaCl 60 mL pain cocktail syringe for knee arthroplasty   Infiltration Once (Intra-Op)       Outpatient Medications:   Prescriptions Prior to Admission[1]    Allergies: Zithromax [azithromycin dihydrate]      Anesthesia Evaluation    Patient summary reviewed.    Anesthetic Complications  (-) history of anesthetic complications         GI/Hepatic/Renal      (+) GERD                           Cardiovascular      ECG reviewed.  Exercise tolerance: good     MET: >4    (+) obesity  (+) hypertension       (-) past MI                (-) angina     (-) FERRARI  (-) orthopnea  (-) PND     Endo/Other               (-) anemia            (+) arthritis       Pulmonary    Negative pulmonary ROS.                       Neuro/Psych    Negative neuro/psych ROS.                                  Past Surgical History:   Procedure Laterality Date    Cholecystectomy  09/20/2011    Laparoscopic Cholecystectomy by Dr. Thompson @ [facility]    Colonoscopy  2004, 2012    Repeat 3 years    Colonoscopy  01/03/2019    Dr. Lagunas     Colonoscopy,biopsy N/A 2015    Procedure: COLONOSCOPY, POSSIBLE BIOPSY, POSSIBLE POLYPECTOMY 76329;  Surgeon: Santhosh Lagunas MD;  Location: [facility], [facility]    D &       miscarriage    Hysteroscopy,with sampling  2012    Knee replacement surgery      Other surgical history  2011    Cystoscopy - Dr. Awan    Other surgical history Bilateral 2016    Procedure: VAGINAL HYSTERECTOMY WITH BILATERAL SALPINGOOPH;  Surgeon: Dejah aSm MD;  Location:  MAIN OR    Other surgical history N/A 2016    Procedure: ANTERIOR POSTERIOR REPAIR;  Surgeon: Leana Davis DO;  Location:  MAIN OR    Tonsillectomy      long time ago     Social History     Socioeconomic History    Marital status:    Tobacco Use    Smoking status: Former     Current packs/day: 0.00     Types: Cigarettes     Quit date: 1980     Years since quittin.4    Smokeless tobacco: Never    Tobacco comments:     35 years ago, pt states unsure of how long she smoked or how much   Vaping Use    Vaping status: Never Used   Substance and Sexual Activity    Alcohol use: Yes     Comment: SOCIALLY    Drug use: No    Sexual activity: Yes     Partners: Male     Birth control/protection: Vasectomy, Hysterectomy     History   Drug Use No     Available pre-op labs reviewed.  Lab Results   Component Value Date    WBC 7.9 2024    RBC 4.56 2024    HGB 13.1 2024    HCT 40.9 2024    MCV 89.7 2024    MCH 28.7 2024    MCHC 32.0 2024    RDW 15.6 2024    .0 2024     Lab Results   Component Value Date     2024    K 4.1 2024     2024    CO2 26.0 2024    BUN 13 2024    CREATSERUM 0.87 2024    GLU 90 2024    CA 10.6 (H) 2024            Airway      Mallampati: II  Mouth opening: 3 FB  TM distance: 4 - 6 cm  Neck ROM: full Cardiovascular    Cardiovascular exam normal.  Rhythm: regular  Rate: normal  (-)  murmur   Dental    Dentition appears grossly intact         Pulmonary    Pulmonary exam normal.  Breath sounds clear to auscultation bilaterally.        (-) wheezes       Other findings              ASA: 2   Plan: regional and spinal  NPO status verified and patient meets guidelines.  Patient has not taken beta blockers in last 24 hours.  Post-procedure pain management plan discussed with surgeon and patient.  Surgeon requests: regional block  Comment: Right adductor canal block requested per TJP  Plan/risks discussed with: patient  Use of blood product(s) discussed with: patient            We discussed risks and benefits of spinal and GA and will opt for spinal w/GA as back up.  We discussed low probability of HA, back ache, failed block, high block.  We discussed PONV prophylaxis and analgesic plan including TJP and right adductor canal block.  The patient's questions were answered and they agree to proceed.          [1]   Medications Prior to Admission   Medication Sig Dispense Refill Last Dose/Taking    azelastine 0.1 % Nasal Solution 2 sprays by Nasal route 2 (two) times daily.   11/24/2024 at  8:00 AM    loratadine 10 MG Oral Tab Take 1 tablet (10 mg total) by mouth daily.   11/11/2024    balsalazide 750 MG Oral Cap Take 3 capsules (2,250 mg total) by mouth in the morning and 3 capsules (2,250 mg total) before bedtime.   11/24/2024 at  7:00 PM    LISINOPRIL 5 MG Oral Tab TAKE 1 TABLET(5 MG) BY MOUTH DAILY 90 tablet 0 11/24/2024 at  8:00 AM    atorvastatin 10 MG Oral Tab Take 1 tablet (10 mg total) by mouth Every Monday, Wednesday, and Friday. 135 tablet 3 11/22/2024    Estradiol 10 MCG Vaginal Tab Place 10 mcg vaginally twice a week. 24 tablet 4 11/25/2024 at  9:00 AM    Polyethylene Glycol 3350 (MIRALAX) Oral Powder Take 17 g by mouth 3 (three) times a week. 1 Bottle 2 Past Month    Inulin 1.5 G Oral Chew Tab Chew 1 tablet by mouth daily. 30 tablet 0 11/18/2024    Psyllium 0.52 G Oral Cap Take 2 capsules by  mouth daily.     11/24/2024 at  8:00 AM    Multiple Vitamins Oral Tab Take 1 tablet by mouth daily.   11/18/2024    Fish Oil-Cholecalciferol (FISH OIL + D3) 4090-1986 MG-UNIT Oral Cap Take 1 capsule by mouth daily.     11/11/2024    aspirin 81 MG Oral Tab EC Take 1 tablet (81 mg total) by mouth daily.   11/18/2024    oxyCODONE 5 MG Oral Tab Take 1 tablet (5 mg total) by mouth every 6 (six) hours as needed.       traMADol 50 MG Oral Tab Take 1 tablet (50 mg total) by mouth every 6 (six) hours as needed.       celecoxib 200 MG Oral Cap Take 1 capsule (200 mg total) by mouth daily.       acetaminophen 500 MG Oral Tab Take 2 tablets (1,000 mg total) by mouth 4 (four) times daily.       docusate sodium 100 MG Oral Cap Take 100 mg by mouth 2 (two) times daily.       aspirin 81 MG Oral Tab EC Take 1 tablet (81 mg total) by mouth in the morning and 1 tablet (81 mg total) before bedtime.

## 2024-11-25 NOTE — ANESTHESIA POSTPROCEDURE EVALUATION
[facility]    Chirag Shelton Patient Status:  Outpatient in a Bed   Age/Gender 74 year old female MRN [id number]   Location [facility] POST ANESTHESIA CARE UNIT Attending Daniel Pang MD   Hosp Day # 0 PCP Derrell Núñez MD       Anesthesia Post-op Note    RIGHT TOTAL KNEE REPLACEMENT    Procedure Summary       Date: 11/25/24 Room / Location:  MAIN OR 12 /  MAIN OR    Anesthesia Start: 1348 Anesthesia Stop: 1535    Procedure: RIGHT TOTAL KNEE REPLACEMENT (Right: Knee) Diagnosis: (RIGHT KNEE OSTEOARTHRITIS)    Surgeons: Daniel Pang MD Anesthesiologist: Drayl Montes MD    Anesthesia Type: spinal, regional ASA Status: 2            Anesthesia Type: spinal, regional    Vitals Value Taken Time   /67 11/25/24 1541   Temp 97.8 11/25/24 1543   Pulse 76 11/25/24 1543   Resp 16 11/25/24 1543   SpO2 99 % 11/25/24 1543   Vitals shown include unfiled device data.    Patient Location: PACU    Anesthesia Type: regional and spinal    Airway Patency: patent    Postop Pain Control: adequate    Mental Status: preanesthetic baseline    Nausea/Vomiting: none    Cardiopulmonary/Hydration status: stable euvolemic    Complications: no apparent anesthesia related complications    Postop vital signs: stable    Dental Exam: Unchanged from Preop    Patient to be discharged from PACU when criteria met.

## 2024-11-25 NOTE — ANESTHESIA PROCEDURE NOTES
Regional Block    Date/Time: 11/25/2024 2:00 PM    Performed by: Daryl Montes MD  Authorized by: Daryl Montes MD      General Information and Staff    Start Time:  11/25/2024 2:00 PM  End Time:  11/25/2024 2:03 PM  Anesthesiologist:  Daryl Montes MD  Performed by:  Anesthesiologist  Patient Location:  OR      Site Identification: real time ultrasound guided and image stored and retrievable    Block site/laterality marked before start: site marked  Reason for Block: at surgeon's request and post-op pain management    Preanesthetic Checklist: 2 patient identifers, IV checked, site marked, risks and benefits discussed, monitors and equipment checked, pre-op evaluation, timeout performed, anesthesia consent, sterile technique used, no prohibitive neurological deficits and no local skin infection at insertion site      Procedure Details    Patient Position:  Supine  Prep: ChloraPrep    Monitoring:  Cardiac monitor, continuous pulse ox, blood pressure cuff and heart rate  Block Type:  Adductor canal  Laterality:  Right  Injection Technique:  Single-shot    Needle    Needle Type:  Short-bevel and echogenic  Needle Gauge:  20 G  Needle Length:  100 mm  Needle Localization:  Ultrasound guidance  Reason for Ultrasound Use: appropriate spread of the medication was noted in real time and no ultrasound evidence of intravascular and/or intraneural injection            Assessment    Injection Assessment:  Good spread noted, negative resistance, negative aspiration for heme, incremental injection and low pressure  Heart Rate Change: No    - Patient tolerated block procedure well without evidence of immediate block related complications.     Medications  11/25/2024 2:00 PM      Additional Comments    Medication:  Bupivacaine 0.25% 20mL with PF decadron 2mg

## 2024-11-25 NOTE — OPERATIVE REPORT
[facility]  TOTAL KNEE OPERATIVE REPORT:  DATE OF SURGERY: 11/25/2024    Chirag Shelton       [id number]     6/2/1950    PREOP DX: RIGHT  KNEE PRIMARY OSTEOARTHRITIS    POSTOP DX:RIGHT KNEE PRIMARY OSTEOARTHRITIS  PROCEDURE: RIGHT TOTAL KNEE REPLACEMENT  SURGEON: TAMIR ENRIQUEZ MD  FIRST ASSIST: BRENDA RM PA-C  FIRST ASSISTANT WAS NECESSARY FOR PATIENT POSITIONING, EXPOSURE/WOUND RETRACTION, JOINT DISLOCATION/REDUCTION, PROSTHESIS IMPLANTATION, WOUND CLOSURE.  ANESTHESIA: SPINAL AND ADDUCTOR CANAL BLOCK  EBL: 50 ml  SPECIMEN: DISTAL FEMORAL AND PROXIMAL TIBIA CUT BONE  COMPLICATIONS: NONE  DRAIN: NONE  IMPLANT USED:DEPUY ATTUNE CR FEMORAL SIZE  4 ,  TIBIA FB SIZE 3, POLY  MS SIZE 6,  35 MEDIALIZED DOME PATELLA  TT: 38  PROCEDURE NOTE:  PATIENT WAS CORRECTLY IDENTIFIED AND OPERATIVE SITE WAS VERIFIED IN THE PREOPERATIVE HOLDING AREA.  PATIENT WAS BROUGHT TO THE OR AND WAS PLACED IN SUPINE POSITION.  PREOPERATIVE ANTIBIOTIC AND TXA WERE GIVEN.   ANESTHESIA WAS ADMINISTERED.  ARMS WERE POSITIONED BT ANESTHESIA.  NON-OPERATIVE LEG HAD SCD APPLIED AND CUSHIONED.  OPERATIVE LEG WAS PREPPED AND DRAPED IN SURGICALLY STERILE AND STANDARD FASHION.   BLOOD WAS EXSANGUINATED.  TOURNIQUET WAS INFLATED.  ANTERIOR LINEAR INCISION WAS MADE.  MEDIAL ARTHROTOMY WAS PERFORMED.  MEDIAL RELEASE WAS DONE TO A LIMITED DEGREE.  RETROPATELLAR FAT AND ANTERIOR FEMORAL FAT WERE REMOVED IN LIMITED FASHION.  PATELLA WAS SUBLUXED LATERALLY.  KNEE WAS FLEXED.  OSTEOARTHRITIS WAS IDENTIFIED.  OSTEOPHYTES WERE REMOVED.  ACL/PCL WERE REMOVED.  PROXIMAL TIBIA WAS SUBLUXED ANTERIORLY.  MEDIAL AND LATERAL MENISCI WERE REMOVED.  PROXIMAL TIBIA CUT WAS MADE USING EXTRAMEDULLARY GUIDE WITH 3 DEGREE  OF POSTERIOR SLOPE.  CUT WAS FOUND TO BE SATISFACTORY.  NEXT, DISTAL FEMORAL CUT WAS MADE WITH INTRAMEDULLARY GUIDE WITH 6 DEGREE VALGUS CUT.  REMAINING OSTEOPHYTES WERE REMOVED.  EXTENSION GAP WAS CHECKED USING A  SPACER.    KNEE WAS FLEXED.   FEMUR WAS SIZED AT 4.  FEMORAL ROTATION WAS SET USING  TENSION GUIDED TECHNIQUE.  ANTERIOR AND POSTERIOR FEMORAL CUT WAS MADE.  FLEXION AND EXTENSION GAPS WERE CHECKED USING RECTAGULAR SPACER BLOCK.  GAPS WERE FOUND TO BE EQUAL.  VALGUS/VARUS STRESS TEST WAS STABLE.   FEMUR WAS FINISHED OFF WITH CHAMFER AND NOTCH CUTS IN USUAL FASHION.  POSTERIOR FEMORAL OSTEOPHYTES WERE REMOVED.  POSTERIOR CAPSULAR RELEASE WAS DONE CAREFULLY.    PROXIMAL TIBIA WAS EXPOSED.  TIBIA WAS SIZED at 3 AND ROTATION WAS SET USING MEDIAL 1/3 OF TIBIA TUBERCLE.  TIBIA WAS PREPARED IN STANDARD FASHION.  TRIAL COMPONENTS WERE PLACED.  PATELLA WAS EVERTED AND CUT WAS MADE FREE HANDED FASHION TO APPROPRIATE THICKNESS.  KNEE WAS EXAMINED.  KNEE WAS RANGED.  GOOD FULL EXTENSION WAS ESTABLISHED AND FLEXION BEYOND BEYOND 120 DEG  KNEE WAS OBTAINED.  KNEE WAS STABLE TO VALGUS AND VARUS STRESS.  PATELLA TRACKED NICELY.  ALL THE TRIAL IMPLANTS WERE REMOVED.  WOUND WAS IRRIGATED.    LOCAL COCKTAIL WAS INJECTED CAREFULLY TO POSTERIOR CAPSULE/GUTTERS/FAT PADS.   CEMENT WAS MIXED.  FIRST TIBIA COMPONENT, THEN FEMORAL COMPONENT WERE APPLIED.  EXCESS CEMENT WAS REMOVED. REAL POLY WAS INSERTED. KNEE WAS EXTENDED.  PATELLAR COMPONENT WAS APPLIED.  TOURNIQUET WAS DEFLATED.  KNEE WAS HELD IN EXTENSION UNTIL CEMENT WAS SET.  HEMOSTASIS WAS OBTAINED.  KNEE WAS RANGED AGAIN WITH GOOD MOTION AND STABILITY.  ARTHROTOMY WAS CLOSED. SUBCUTANEOUS LAYER WAS CLOSED IN MULTIPLE LAYERS.  SKIN WAS CLOSED.  STERILE DRESSING WAS APPLIED.    Daniel Pang MD  11/25/2024

## 2024-11-26 VITALS
WEIGHT: 246.38 LBS | RESPIRATION RATE: 16 BRPM | DIASTOLIC BLOOD PRESSURE: 53 MMHG | HEIGHT: 63.5 IN | BODY MASS INDEX: 43.12 KG/M2 | TEMPERATURE: 98 F | SYSTOLIC BLOOD PRESSURE: 138 MMHG | OXYGEN SATURATION: 96 % | HEART RATE: 76 BPM

## 2024-11-26 PROCEDURE — 97116 GAIT TRAINING THERAPY: CPT

## 2024-11-26 PROCEDURE — 97165 OT EVAL LOW COMPLEX 30 MIN: CPT

## 2024-11-26 PROCEDURE — 97161 PT EVAL LOW COMPLEX 20 MIN: CPT

## 2024-11-26 PROCEDURE — 97535 SELF CARE MNGMENT TRAINING: CPT

## 2024-11-26 RX ORDER — LISINOPRIL 5 MG/1
5 TABLET ORAL DAILY
Status: DISCONTINUED | OUTPATIENT
Start: 2024-11-26 | End: 2024-11-26

## 2024-11-26 RX ORDER — ATORVASTATIN CALCIUM 10 MG/1
10 TABLET, FILM COATED ORAL
Status: DISCONTINUED | OUTPATIENT
Start: 2024-11-27 | End: 2024-11-26

## 2024-11-26 NOTE — PROGRESS NOTES
NURSING DISCHARGE NOTE    Discharged Home via Wheelchair.  Accompanied by Support staff  Belongings Taken by patient/family.    Patient cleared for discharge by hospitalist, ortho, and PT/OT. IV removed by Penelope FRANCIS. Patient educated on all AVS discharge information by Penelope FRANCIS, all questions answered. Pt brought down to south parking garage with all belongings to be driven home by spouse. Discharging with [facility]

## 2024-11-26 NOTE — CM/SW NOTE
11/26/24 0900   CM/SW Referral Data   Referral Source Social Work (self-referral)   Reason for Referral Discharge planning   Informant EMR;Clinical Staff Member   Discharge Needs   Anticipated D/C needs Home health care       Patient is a 75 y/o woman admitted s/p TKR.  Pt with pre-operative plan for [facility] at [place].  PT recommending [facility] services at discharge.  Liana from [facility] confirmed pt accepted for [facility] services at [place].  No other [place] needs/concerns identified at this time.  / to remain available for support and/or discharge planning.     Kiki Monzon, [facility]  Discharge Planner  [phone]

## 2024-11-26 NOTE — PLAN OF CARE
Alert and Oriented x4. On RA. VSS. Dressing to Rt Knee in place, C/D/I; Severe Pain controlled by PRN medications, see MAR for actions. Denies N/T. Voiding freely. Tolerating diet. Denies N/V. Call light within reach at this time.    Plan: PT/OT eval, pain management

## 2024-11-26 NOTE — PROGRESS NOTES
[facility]   part of [facility]        Chirag Shelton Patient Status:  Outpatient in a Bed    1950 MRN [id number]   Location [facility] 3SW-A Attending Daniel Pang MD   Hosp Day # 0 PCP Derrell Núñez MD       Subjective:    POD #1 s/p right total knee arthroplasty  No major complaints.  No calf pain, CP, SOB, lightheadedness, nausea.      Physical Exam:  Temp:  [97.3 °F (36.3 °C)-98.2 °F (36.8 °C)] 97.8 °F (36.6 °C)  Pulse:  [64-91] 83  Resp:  [10-24] 16  BP: (122-169)/(38-99) 150/72  SpO2:  [90 %-100 %] 95 %    In no acute distress  Knee Aquacel dressing is clean and dry.  No signs of infection  Thigh and leg are soft.  Both calves are NT.  No signs of DVT.  NVI +PF/DF +PP    Data review:  Post op x-ray reviewed.    S/p Right TKR      Today's plan discussed.  Significance of achieving good ROM in a timely fashion explained.  PT/OT  DVT prophylaxis with  aspirin  Anticipate DC to home.      Mariann Zimmerman PA-C

## 2024-11-26 NOTE — OCCUPATIONAL THERAPY NOTE
OCCUPATIONAL THERAPY EVALUATION - INPATIENT    Room Number: 366/366-A  Evaluation Date: 11/26/2024     Type of Evaluation: Initial  Presenting Problem: s/p R TKA 11/25/24    Physician Order: IP Consult to Occupational Therapy  Reason for Therapy:  ADL/IADL Dysfunction and Discharge Planning    OCCUPATIONAL THERAPY ASSESSMENT   Patient is a 74 year old female admitted on 11/25/2024 with Presenting Problem: s/p R TKA 11/25/24. Co-Morbidities : syncope, L TKA '14  Patient is currently functioning near baseline with toileting, lower body dressing, and dynamic standing balance.  Prior to admission, patient's baseline is independent.  Patient met all OT goals at supervision to Mod I level.  Patient reports no further questions/concerns at this time.     No further skilled OT needs at this time.    Recommendations for nursing staff:   Transfers: 1-person  Toileting location: Toilet    EVALUATION SESSION:  Patient at start of session: chair    FUNCTIONAL TRANSFER ASSESSMENT  Sit to Stand: Edge of Bed; Chair  Edge of Bed: Supervision  Chair: Supervision  Toilet Transfer: Supervision (standard height, light use of grab bar, reports counter adjacent at home)    BED MOBILITY  Supine to Sit : Modified Independent  Sit to Supine (OT): Modified Independent    BALANCE ASSESSMENT     FUNCTIONAL ADL ASSESSMENT  Eating: Modified Independent  Grooming Standing: Modified Independent  UB Dressing Seated: Modified Independent  LB Dressing Seated: Supervision (without AE via forward lean)  LB Dressing Standing: Supervision  Toileting Seated: Supervision  Toileting Standing: Supervision    ACTIVITY TOLERANCE: WFL                         O2 SATURATIONS       COGNITION  Overall Cognitive Status:  WFL - within functional limits    COGNITION ASSESSMENTS     Upper Extremity:   ROM: within functional limits   Strength: is within functional limits     EDUCATION PROVIDED  Patient Education : Role of Occupational Therapy; Functional Transfer  Techniques; Fall Prevention; Weight Bear Status; Posture/Positioning  Patient's Response to Education: Verbalized Understanding    Equipment used: RW  Demonstrates functional use    Therapist comments: Patient motivated and participatory. Educated on safety precautions and incorporation into ADLs and transfers, followed with good return demo. Patient performed all ADLs and functional transfers at Supervision to Mod I level. Patient aware of recommendation for initial supervision at discharge, including supervision for shower transfers and increased assist with IADLs; patient reports will have initial supervision/assist as needed from  at discharge. Patient reports no further questions or concerns regarding discharge home to ADLs.     Patient End of Session: Up in chair;Needs met;Call light within reach;RN aware of session/findings;All patient questions and concerns addressed;Hospital anti-slip socks;SCDs in place    OCCUPATIONAL PROFILE    HOME SITUATION  Type of Home: House (at [facility])  Home Layout: One level  Lives With: Spouse    Toilet and Equipment: Comfort height toilet  Shower/Tub and Equipment: Walk-in shower;Grab bar       Occupation/Status: retired teacher     Drives: Yes  Patient Regularly Uses: Glasses    Prior Level of Function: Patient reports independent in all I/ADL and functional mobility via cane recently prior to admission.    SUBJECTIVE  \"It's mostly my thigh that hurts\"    PAIN ASSESSMENT  Ratin  Location: R thigh  Management Techniques: Activity promotion;Repositioning;Nurse notified;Relaxation;Ice    OBJECTIVE  Precautions: None  Fall Risk: Standard fall risk    WEIGHT BEARING RESTRICTION  R Lower Extremity: Weight Bearing as Tolerated      AM-PAC ‘6-Clicks’ Inpatient Daily Activity Short Form  -   Putting on and taking off regular lower body clothing?: A Little (supervision)  -   Bathing (including washing, rinsing, drying)?: A Little (supervision)  -   Toileting, which includes  using toilet, bedpan or urinal? : A Little (supervision)  -   Putting on and taking off regular upper body clothing?: None  -   Taking care of personal grooming such as brushing teeth?: None  -   Eating meals?: None    AM-PAC Score:  Score: 21  Approx Degree of Impairment: 32.79%  Standardized Score (AM-PAC Scale): 44.27    ADDITIONAL TESTS     NEUROLOGICAL FINDINGS      PLAN   Patient has been evaluated and presents with no skilled Occupational Therapy needs at this time.  Patient discharged from Occupational Therapy services.  Please re-order if a new functional limitation presents during this admission.    OT Device Recommendations: None    Patient Evaluation Complexity Level:   Occupational Profile/Medical History LOW - Brief history including review of medical or therapy records    Specific performance deficits impacting engagement in ADL/IADL LOW  1 - 3 performance deficits    Client Assessment/Performance Deficits LOW - No comorbidities nor modifications of tasks    Clinical Decision Making LOW - Analysis of occupational profile, problem-focused assessments, limited treatment options    Overall Complexity LOW     OT Session Time: 23 minutes  Self-Care Home Management: 8 minutes

## 2024-11-26 NOTE — CONSULTS
General Medicine Consult      Reason for consult:    Consulted by: No att. providers found    PCP: Derrell Núñez MD      History of Present Illness: Patient is a 74 year old female with a past medical history of hypertension, hyperlipidemia, IBD, Crohn's disease, GERD presents to the hospital for right TKA.  Underwent procedure on 11/25 and tolerated well.  Medicine team consulted for postoperative management.      PMH:  Past Medical History:    Crohn disease (HCC)    Esophageal reflux    High blood pressure    High cholesterol    History of prolapse of bladder    Hypertension    IBD (inflammatory bowel disease)    Mixed hyperlipidemia    Osteoarthrosis, unspecified whether generalized or localized, unspecified site    Primary osteoarthritis involving multiple joints    Visual impairment    GLASSES        PSH:  Past Surgical History:   Procedure Laterality Date    Cholecystectomy  09/20/2011    Laparoscopic Cholecystectomy by Dr. Thompson @ [facility]    Colonoscopy  2004, 2012    Repeat 3 years    Colonoscopy  01/03/2019    Dr. Lagunas    Colonoscopy,biopsy N/A 08/13/2015    Procedure: COLONOSCOPY, POSSIBLE BIOPSY, POSSIBLE POLYPECTOMY 99435;  Surgeon: Santhosh Lagunas MD;  Location: [facility]    [address]    Hysteroscopy,with sampling  01/05/2012    Knee replacement surgery      Other surgical history  12/13/2011    Cystoscopy - Dr. Awan    Other surgical history Bilateral 07/11/2016    Procedure: VAGINAL HYSTERECTOMY WITH BILATERAL SALPINGOOPH;  Surgeon: Dejah Sam MD;  Location:  MAIN OR    Other surgical history N/A 07/11/2016    Procedure: ANTERIOR POSTERIOR REPAIR;  Surgeon: Leana Davis DO;  Location:  MAIN OR    Tonsillectomy      long time ago        Home Medications:  Medications Taking[1]    Scheduled Medication:   lisinopril  5 mg Oral Daily    [START ON 11/27/2024] atorvastatin  10 mg Oral Q MWF    aspirin  81 mg Oral BID    sennosides  17.2 mg Oral  Nightly    docusate sodium  100 mg Oral BID    famotidine  20 mg Oral BID    Or    famotidine  20 mg Intravenous BID    acetaminophen  650 mg Oral Q4H While awake    ibuprofen  600 mg Oral 4 times per day    traMADol  50 mg Oral 4 times per day     Continuous Infusing Medication:   lactated ringers Stopped (24 1530)    lactated ringers       PRN Medication:  sodium chloride    polyethylene glycol (PEG 3350)    magnesium hydroxide    bisacodyl    fleet enema    ondansetron    metoclopramide    diphenhydrAMINE **OR** diphenhydrAMINE    tiZANidine    oxyCODONE **OR** oxyCODONE    HYDROmorphone **OR** HYDROmorphone     ALL:  Allergies[2]     Soc Hx:  Social History     Tobacco Use    Smoking status: Former     Current packs/day: 0.00     Types: Cigarettes     Quit date: 1980     Years since quittin.4    Smokeless tobacco: Never    Tobacco comments:     35 years ago, pt states unsure of how long she smoked or how much   Substance Use Topics    Alcohol use: Yes     Comment: SOCIALLY        Fam Hx  Family History   Adopted: Yes   Family history unknown: Yes       Review of Systems  Comprehensive ROS reviewed and negative except for what's stated above.  Including negative for chest pain, shortness of breath, syncope.       OBJECTIVE:  /53 (BP Location: Left arm)   Pulse 76   Temp 98.2 °F (36.8 °C) (Oral)   Resp 16   Ht 5' 3.5\" (1.613 m)   Wt 246 lb 6.4 oz (111.8 kg)   SpO2 96%   BMI 42.96 kg/m²   General:  Alert, no distress, appears stated age.   Head:  Normocephalic, without obvious abnormality, atraumatic.   Eyes:  Sclera anicteric, No conjunctival pallor, EOMs intact.    Nose: Nares normal. Septum midline. Mucosa normal. No drainage.   Throat: Lips, mucosa, and tongue normal. Teeth and gums normal.   Neck: Supple, symmetrical, trachea midline, no cervical or supraclavicular lymph adenopathy, thyroid: no enlargment/tenderness/nodules appreciated   Lungs:   Clear to auscultation bilaterally.  Normal effort   Chest wall:  No tenderness or deformity.   Heart:  Regular rate and rhythm, S1, S2 normal, no murmur, rub or gallop appreciated   Abdomen:   Soft, non-tender. Bowel sounds normal. No masses,  No organomegaly. Non distended   Extremities: Extremities normal, atraumatic, no cyanosis or edema.   Skin: Skin color, texture, turgor normal. No rashes or lesions.    Neurologic: Normal strength, no focal deficit appreciated       Diagnostics:   CBC/Chem  No results for input(s): \"WBC\", \"HGB\", \"MCV\", \"PLT\", \"BAND\", \"INR\" in the last 168 hours.    Invalid input(s): \"LYM#\", \"MONO#\", \"BASOS#\", \"EOSIN#\"    No results for input(s): \"NA\", \"K\", \"CL\", \"CO2\", \"BUN\", \"CREATSERUM\", \"GLU\", \"CA\", \"CAION\", \"MG\", \"PHOS\" in the last 168 hours.    No results for input(s): \"ALT\", \"AST\", \"ALB\", \"AMYLASE\", \"LIPASE\", \"LDH\" in the last 168 hours.    Invalid input(s): \"ALPHOS\", \"TBIL\", \"DBIL\", \"TPROT\"    Radiology:   All imaging personally reviewed      XR KNEE (1 OR 2 VIEWS), RIGHT (CPT=73560)    Result Date: 11/25/2024  PROCEDURE:  XR KNEE (1 OR 2 VIEWS), RIGHT (CPT=73560)  COMPARISON:  None.  INDICATIONS:  Postop total knee arthroplasty  PATIENT STATED HISTORY: (As transcribed by Technologist)  Image taken post operation. Patient offered no additional history at this time.    FINDINGS:  Right total knee prosthesis in place.  No fracture or dislocation.  Postsurgical soft tissue gas anteriorly.            CONCLUSION:  Right total knee prosthesis in place.   LOCATION:  [facility] ([place])   Dictated by (CST): Tomer Alas MD on 11/25/2024 at 5:16 PM     Finalized by (CST): Tomer Alas MD on 11/25/2024 at 5:17 PM            ASSESSMENT / PLAN:    Patient is a 74 year old female with a past medical history of hypertension, hyperlipidemia, IBD, Crohn's disease, GERD presents to the hospital for right TKA.      S/p Right TKA    - PO/IV meds for pain control per surgery, wean to oral meds as able  - Adv diet as tolerated per surgery, zofran prn  for nausea, OBR  - PT/OT/SW  - monitor for acute blood loss anemia, cbc in am  - aspirin 81 mg BID and SCD for DVT prophy  - further management per surgery    Hypertension  -Continue home lisinopril    Hyperlipidemia  -Continue home atorvastatin    Outpatient records reviewed confirming patient's medical history and medications. Discussed with ortho.     Further recommendations pending patient's clinical course.  [facility] hospitalist to continue to follow patient while in house    Patient and/or patient's family given opportunity to ask questions and note understanding and agreeing with therapeutic plan as outlined      Thank you for allowing me to participate in the care of this patient.       Damián Lagunas DO  [facility]  Hospitalist  Contact via [employer]          Advanced Care Planning    While discussing goals of care with the patient and their family, patient voluntarily participated in an advanced care planning discussion. The following was discussed: Patient is full code.    16 Minutes were spent in discussing advanced care planning. This time was exclusive of the documented time for this visit.                     [1]   Outpatient Medications Marked as Taking for the 11/25/24 encounter (Hospital Encounter)   Medication Sig Dispense Refill    azelastine 0.1 % Nasal Solution 2 sprays by Nasal route 2 (two) times daily.      loratadine 10 MG Oral Tab Take 1 tablet (10 mg total) by mouth daily.      balsalazide 750 MG Oral Cap Take 3 capsules (2,250 mg total) by mouth in the morning and 3 capsules (2,250 mg total) before bedtime.      LISINOPRIL 5 MG Oral Tab TAKE 1 TABLET(5 MG) BY MOUTH DAILY 90 tablet 0    atorvastatin 10 MG Oral Tab Take 1 tablet (10 mg total) by mouth Every Monday, Wednesday, and Friday. 135 tablet 3    Estradiol 10 MCG Vaginal Tab Place 10 mcg vaginally twice a week. 24 tablet 4    Polyethylene Glycol 3350 (MIRALAX) Oral Powder Take 17 g by mouth 3 (three) times a week. 1  Bottle 2    Inulin 1.5 G Oral Chew Tab Chew 1 tablet by mouth daily. 30 tablet 0    Psyllium 0.52 G Oral Cap Take 2 capsules by mouth daily.        Multiple Vitamins Oral Tab Take 1 tablet by mouth daily.      Fish Oil-Cholecalciferol (FISH OIL + D3) 1268-6867 MG-UNIT Oral Cap Take 1 capsule by mouth daily.       [2]   Allergies  Allergen Reactions    Zithromax [Azithromycin Dihydrate] NAUSEA AND VOMITING

## 2024-11-26 NOTE — PHYSICAL THERAPY NOTE
PHYSICAL THERAPY EVALUATION - INPATIENT     Room Number: 366/366-A  Evaluation Date: 2024  Type of Evaluation: Initial  Physician Order: PT Eval and Treat    Presenting Problem: s/p R TKA 24  Co-Morbidities : syncope, L TKA '14  Reason for Therapy: Mobility Dysfunction and Discharge Planning    PHYSICAL THERAPY ASSESSMENT   Patient is a 74 year old female admitted 2024 for elective R TKA.   Patient is currently functioning near baseline with bed mobility, transfers, gait, and stair negotiation. Prior to admission, patient's baseline is MOD I.     Patient will benefit from continued skilled PT Services at discharge to promote prior level of function.  Anticipate patient will return home with home health PT.    PLAN  Patient has been evaluated and presents with no skilled Physical Therapy needs at this time.  Patient discharged from Physical Therapy services.  Please re-order if a new functional limitation presents during this admission.    PT Device Recommendation: Rollator    GOALS  Patient was able to achieve the following goals ...    Patient was able to transfer Safely and independently   Patient able to ambulate on level surfaces Safely and independently     HOME SITUATION  Type of Home: House  Home Layout: One level  Stairs to Enter : 2   Railing: Yes              Lives With: Spouse    Drives: Yes   Patient Regularly Uses: Glasses     Prior Level of [place]: Pt lives with spouse in single story home with 2 RYLEE. Pt independent with ADL and mobility. Pt does not ambulate with RW or cane at baseline.     SUBJECTIVE  \"Its just my thigh that hurts.\"     OBJECTIVE  Precautions: None  Fall Risk: Standard fall risk    WEIGHT BEARING RESTRICTION  R Lower Extremity: Weight Bearing as Tolerated    PAIN ASSESSMENT  Ratin  Location: R knee  Management Techniques: Activity promotion;Repositioning    COGNITION  Overall Cognitive Status:  WFL - within functional limits    RANGE OF MOTION AND  STRENGTH ASSESSMENT  Upper extremity ROM and strength are within functional limits     Lower extremity ROM is within functional limits except   R knee flex 80 degrees  R knee ext lacking 10 degrees    Lower extremity strength is within functional limits     BALANCE  Static Sitting: Good  Dynamic Sitting: Fair +  Static Standing: Fair -  Dynamic Standing: Fair -    ADDITIONAL TESTS                                    ACTIVITY TOLERANCE                         O2 WALK       NEUROLOGICAL FINDINGS                        AM-PAC '6-Clicks' INPATIENT SHORT FORM - BASIC MOBILITY  How much difficulty does the patient currently have...  Patient Difficulty: Turning over in bed (including adjusting bedclothes, sheets and blankets)?: A Little   Patient Difficulty: Sitting down on and standing up from a chair with arms (e.g., wheelchair, bedside commode, etc.): A Little   Patient Difficulty: Moving from lying on back to sitting on the side of the bed?: A Little   How much help from another person does the patient currently need...   Help from Another: Moving to and from a bed to a chair (including a wheelchair)?: A Little   Help from Another: Need to walk in hospital room?: A Little   Help from Another: Climbing 3-5 steps with a railing?: A Little       AM-PAC Score:  Raw Score: 18   Approx Degree of Impairment: 46.58%   Standardized Score (AM-PAC Scale): 43.63   CMS Modifier (G-Code): CK    FUNCTIONAL ABILITY STATUS  Gait Assessment   Functional Mobility/Gait Assessment  Gait Assistance: Supervision  Distance (ft): 250  Assistive Device: Rolling walker  Stairs: Stairs;Car transfer  How Many Stairs: 4  Assist: Supervision  Car transfer: supervision    Skilled Therapy Provided   Reviewed RW recommendation.   VC for transfer set up.   Sit-stand to RW with supervision.   Ambulatory with step through gait pattern and supervision using RW.   VC for sequencing and terminal knee ext.   Ascended/descended 4 stairs with supervision.   VC  for sequencing.   Able to perform simulated car transfer with supervision.   Returned to room sitting up in bedside chair.     Bed Mobility:  Rolling: NT  Supine to sit: NT   Sit to supine: NT     Transfer Mobility:  Sit to stand: supervision   Stand to sit: supervision  Gait = supervision    Therapist's comments: Reviewed activity recommendations. RW issued.     Exercise/Education Provided:  Bed mobility  Energy conservation  Functional activity tolerated  Gait training  Posture  Strengthening  Transfer training    Patient End of Session: Up in chair;Needs met;Call light within reach;RN aware of session/findings;All patient questions and concerns addressed;Hospital anti-slip socks;Ice applied;Family present    Patient Evaluation Complexity Level:  History Moderate - 1 or 2 personal factors and/or co-morbidities   Examination of body systems Low -  addressing 1-2 elements   Clinical Presentation Low- Stable   Clinical Decision Making Low Complexity       PT Session Time: 30 minutes  Gait Training: 10 minutes  Therapeutic Activity:  minutes  Neuromuscular Re-education:  minutes  Therapeutic Exercise:  minutes

## 2024-11-26 NOTE — PLAN OF CARE
Patient alert and oriented x4. VSS on RA. Pain controlled with scheduled meds. Denies n/t. Aquacel dressing to right knee, CDI. Spandagrip in place. Gel ice at bedside. SCDs in place, ankle pumps encouraged, IS encouraged. Pt rolling side to side. DTV by 0030. Tolerating diet, no c/o n/v.     Plan: PT/OT, pain control    Quantity Per Injection Site (Units): 3

## 2024-11-26 NOTE — PLAN OF CARE
Patient alert and oriented x4. VSS on RA. Pain controlled with scheduled medications. Denies n/t. Aquacel dressing to right knee, CDI. Spanda  in place. Gel ice at bedside. SCDs in place, ankle pumps encouraged, IS encouraged. Up x1 with the walker. Voiding freely in bathroom. Tolerating diet, no c/o n/v.     Plan: PT/OT, discharge with [facility] when cleared by all services

## (undated) DEVICE — PAD KNEE POS PROTCT MEM GEL FOAM BOOT AND

## (undated) DEVICE — CONTAINER,SPECIMEN,PNEU TUBE,4OZ,OR STRL: Brand: MEDLINE

## (undated) DEVICE — NEPTUNE E-SEP SMOKE EVACUATION PENCIL, COATED, 70MM BLADE, PUSH BUTTON SWITCH: Brand: NEPTUNE E-SEP

## (undated) DEVICE — ATTUNE PINNING SYSTEM: Brand: ATTUNE

## (undated) DEVICE — COVER,LIGHT,CAMERA,HARD,1/PK,STRL: Brand: MEDLINE

## (undated) DEVICE — NEEDLE SPNL 20GA L3.5IN YEL QNCKE STYL DISP

## (undated) DEVICE — ELECTRODE ES L10.2CM BLDE L4IN EXT MPLR OPN

## (undated) DEVICE — SUT STRATAFIX SYMMTRC PDS+ 1 18IN CTX ABSRB V

## (undated) DEVICE — UNIVERSAL STERIBUMP® STERILE (5/CASE): Brand: UNIVERSAL STERIBUMP®

## (undated) DEVICE — 3M™ IOBAN™ 2 ANTIMICROBIAL INCISE DRAPE 6651EZ: Brand: IOBAN™ 2

## (undated) DEVICE — TOTAL KNEE CDS: Brand: MEDLINE INDUSTRIES, INC.

## (undated) DEVICE — DISPOSABLE TOURNIQUET CUFF SINGLE BLADDER, DUAL PORT AND QUICK CONNECT CONNECTOR: Brand: COLOR CUFF

## (undated) DEVICE — STRYKER PERFORMANCE SERIES SAGITTAL BLADE: Brand: STRYKER PERFORMANCE SERIES

## (undated) DEVICE — WRAP COMPR UNIV KNEE HOT CLD GEL MICWV AND

## (undated) DEVICE — STOCKINETTE,IMPERVIOUS,12X48,STERILE: Brand: MEDLINE

## (undated) DEVICE — SUT STRATAFIX MCRYL + 3-0 30X30CM ABSRB UD PS-2

## (undated) DEVICE — GLOVE SUR 7 SENSICARE PI PIP GRN PWD F

## (undated) DEVICE — GOWN SUR 2XL LEV 4 BLU W/ WHT V NK BND AERO

## (undated) DEVICE — DRAPE,U/SHT,SPLIT,FILM,60X84,STERILE: Brand: MEDLINE

## (undated) DEVICE — HOOD: Brand: FLYTE

## (undated) DEVICE — SLEEVE COMPR MD KNEE LEN SGL USE KENDALL SCD

## (undated) DEVICE — APPLICATOR PREP 26ML CHG 2% ISO ALC 70%

## (undated) DEVICE — SYRINGE MED 20ML STD CLR PLAS LL TIP N CTRL

## (undated) DEVICE — SOLUTION IRRIG 1000ML 0.9% NACL USP BTL

## (undated) DEVICE — SOLUTION IRRIG 3000ML 0.9% NACL FLX CONT

## (undated) DEVICE — ANTIBACTERIAL UNDYED BRAIDED (POLYGLACTIN 910), SYNTHETIC ABSORBABLE SUTURE: Brand: COATED VICRYL

## (undated) DEVICE — GLOVE SUR 7.5 SENSICARE PI PIP CRM PWD F

## (undated) DEVICE — BANDAGE,COHESIVE,TAN,4X5YD,LF,STRL: Brand: MEDLINE

## (undated) DEVICE — 3 BONE CEMENT MIXER: Brand: MIXEVAC

## (undated) DEVICE — GLOVE SUR 7 SENSICARE PI PIP CRM PWD F

## (undated) DEVICE — BNDG,ELSTC,MATRIX,STRL,6"X5YD,LF,HOOK&LP: Brand: MEDLINE

## (undated) DEVICE — SUT VCRL 1 27IN CPX ABSRB UD L48MM 1/2 CIR

## (undated) DEVICE — SUT COAT VCRL 0 27IN CP-1 ABSRB UD 36MM 1/2

## (undated) DEVICE — ADHESIVE SKIN TOP FOR WND CLSR DERMBND ADV

## (undated) DEVICE — 450 ML BOTTLE OF 0.05% CHLORHEXIDINE GLUCONATE IN 99.95% STERILE WATER FOR IRRIGATION, USP AND APPLICATOR.: Brand: IRRISEPT ANTIMICROBIAL WOUND LAVAGE

## (undated) DEVICE — HOOD, PEEL-AWAY: Brand: FLYTE

## (undated) NOTE — LETTER
Patient Name: Chirag Shelton  Surgery Date: 11/25/2024  Medical Record: [id number]  CSN: [id number]    Surgeon(s):  Daniel Pang MD  Consent Procedure: RIGHT TOTAL KNEE REPLACEMENT  Anesthesia Type: Spinal    To Attending: Daniel Pang MD  To PCP:  Dr Pena      ANESTHESIOLOGIST Dr Jauregui HAS REQUESTED THE FOLLOWING:  NOTE OF CARDIAC CLEARANCE- abnormal EKG  _____________________________________________________